# Patient Record
Sex: FEMALE | Race: WHITE | NOT HISPANIC OR LATINO | Employment: FULL TIME | URBAN - METROPOLITAN AREA
[De-identification: names, ages, dates, MRNs, and addresses within clinical notes are randomized per-mention and may not be internally consistent; named-entity substitution may affect disease eponyms.]

---

## 2017-01-05 ENCOUNTER — OFFICE VISIT (OUTPATIENT)
Dept: OBSTETRICS AND GYNECOLOGY | Facility: CLINIC | Age: 27
End: 2017-01-05
Payer: MEDICAID

## 2017-01-05 ENCOUNTER — LAB VISIT (OUTPATIENT)
Dept: LAB | Facility: HOSPITAL | Age: 27
End: 2017-01-05
Attending: NURSE PRACTITIONER
Payer: MEDICAID

## 2017-01-05 VITALS
WEIGHT: 213.38 LBS | BODY MASS INDEX: 39.27 KG/M2 | SYSTOLIC BLOOD PRESSURE: 110 MMHG | HEIGHT: 62 IN | DIASTOLIC BLOOD PRESSURE: 60 MMHG

## 2017-01-05 DIAGNOSIS — F19.10: Primary | ICD-10-CM

## 2017-01-05 DIAGNOSIS — F17.200 CURRENT SMOKER: ICD-10-CM

## 2017-01-05 DIAGNOSIS — F19.10: ICD-10-CM

## 2017-01-05 DIAGNOSIS — O99.321: ICD-10-CM

## 2017-01-05 DIAGNOSIS — O99.321: Primary | ICD-10-CM

## 2017-01-05 LAB
AMPHET+METHAMPHET UR QL: NEGATIVE
BARBITURATES UR QL SCN>200 NG/ML: NEGATIVE
BASOPHILS # BLD AUTO: 0.02 K/UL
BASOPHILS NFR BLD: 0.1 %
BENZODIAZ UR QL SCN>200 NG/ML: NEGATIVE
BILIRUB UR QL STRIP: NEGATIVE
BZE UR QL SCN: NEGATIVE
CANNABINOIDS UR QL SCN: NORMAL
CLARITY UR REFRACT.AUTO: ABNORMAL
COLOR UR AUTO: YELLOW
CREAT UR-MCNC: 186 MG/DL
DIFFERENTIAL METHOD: ABNORMAL
EOSINOPHIL # BLD AUTO: 0.1 K/UL
EOSINOPHIL NFR BLD: 0.6 %
ERYTHROCYTE [DISTWIDTH] IN BLOOD BY AUTOMATED COUNT: 13.3 %
ETHANOL UR-MCNC: <10 MG/DL
GLUCOSE SERPL-MCNC: 106 MG/DL
GLUCOSE UR QL STRIP: NEGATIVE
HCT VFR BLD AUTO: 37.8 %
HGB BLD-MCNC: 12.9 G/DL
HGB UR QL STRIP: NEGATIVE
KETONES UR QL STRIP: NEGATIVE
LEUKOCYTE ESTERASE UR QL STRIP: NEGATIVE
LYMPHOCYTES # BLD AUTO: 3 K/UL
LYMPHOCYTES NFR BLD: 22 %
MCH RBC QN AUTO: 32.2 PG
MCHC RBC AUTO-ENTMCNC: 34.1 %
MCV RBC AUTO: 94 FL
METHADONE UR QL SCN>300 NG/ML: NEGATIVE
MONOCYTES # BLD AUTO: 0.7 K/UL
MONOCYTES NFR BLD: 5.2 %
NEUTROPHILS # BLD AUTO: 9.7 K/UL
NEUTROPHILS NFR BLD: 72 %
NITRITE UR QL STRIP: NEGATIVE
OPIATES UR QL SCN: NORMAL
PCP UR QL SCN>25 NG/ML: NEGATIVE
PH UR STRIP: 6 [PH] (ref 5–8)
PLATELET # BLD AUTO: 249 K/UL
PMV BLD AUTO: 11.3 FL
PROT UR QL STRIP: ABNORMAL
RBC # BLD AUTO: 4.01 M/UL
SP GR UR STRIP: 1.02 (ref 1–1.03)
TOXICOLOGY INFORMATION: NORMAL
URN SPEC COLLECT METH UR: ABNORMAL
UROBILINOGEN UR STRIP-ACNC: NEGATIVE EU/DL
WBC # BLD AUTO: 13.45 K/UL

## 2017-01-05 PROCEDURE — 86850 RBC ANTIBODY SCREEN: CPT

## 2017-01-05 PROCEDURE — 82947 ASSAY GLUCOSE BLOOD QUANT: CPT

## 2017-01-05 PROCEDURE — 86703 HIV-1/HIV-2 1 RESULT ANTBDY: CPT

## 2017-01-05 PROCEDURE — 87340 HEPATITIS B SURFACE AG IA: CPT

## 2017-01-05 PROCEDURE — 86900 BLOOD TYPING SEROLOGIC ABO: CPT

## 2017-01-05 PROCEDURE — 99999 PR PBB SHADOW E&M-EST. PATIENT-LVL III: CPT | Mod: PBBFAC,,, | Performed by: NURSE PRACTITIONER

## 2017-01-05 PROCEDURE — 99203 OFFICE O/P NEW LOW 30 MIN: CPT | Mod: TH,S$PBB,, | Performed by: NURSE PRACTITIONER

## 2017-01-05 PROCEDURE — 86592 SYPHILIS TEST NON-TREP QUAL: CPT

## 2017-01-05 PROCEDURE — 85025 COMPLETE CBC W/AUTO DIFF WBC: CPT

## 2017-01-05 PROCEDURE — 86762 RUBELLA ANTIBODY: CPT

## 2017-01-05 NOTE — MR AVS SNAPSHOT
[facility]  [address] [facility] [address]  Phone: [phone]  Fax: [phone]                  Alize Morrow   2017 2:45 PM   Office Visit    Description:  Female : 1990   Provider:  Sophie Mirza NP   Department:  [facility]           Reason for Visit     Possible Pregnancy           Diagnoses this Visit        Comments    Drug abuse of mother, first trimester    -  Primary     Current smoker                To Do List           Future Appointments        Provider Department Dept Phone    2017 10:00 AM ULTRASOUND, OB-GYN [facility] OB/ -[phone]    2017 10:30 AM Blanca Berger CNM [facility] OB/ -[phone]    2017 11:15 AM ADDITIONS SPECIAL, ONLC [facility] OB/ -[phone]      Goals (5 Years of Data)     None      [facility]     [facility] -  Assistance  Registered nurses in the [facility] provide clinical advisement, health education, appointment booking, and other advisory services.  Call for this free service at [phone].             Medications           Message regarding Medications     Verify the changes and/or additions to your medication regime listed below are the same as discussed with your clinician today.  If any of these changes or additions are incorrect, please notify your healthcare provider.        STOP taking these medications     naproxen sodium (ANAPROX) 550 MG tablet Take 1 tablet (550 mg total) by mouth 2 (two) times daily as needed.           Verify that the below list of medications is an accurate representation of the medications you are currently taking.  If none reported, the list may be blank. If incorrect, please contact your healthcare provider. Carry this list with you in case of emergency.           Current Medications            Clinical Reference Information           Vital Signs - Last Recorded  Most recent update: 2017  2:50 PM  "by Mercy Boss MA    BP Ht Wt LMP BMI    110/60 5' 2" (1.575 m) 96.8 kg (213 lb 6.5 oz) 11/16/2016 39.03 kg/m2      Blood Pressure          Most Recent Value    BP  110/60      Allergies as of 1/5/2017     No Known Allergies      Immunizations Administered on Date of Encounter - 1/5/2017     None      Orders Placed During Today's Visit      Normal Orders This Visit    C. trachomatis/N. gonorrhoeae by AMP DNA Cervicovaginal     POCT urine pregnancy     Toxicology screen, urine     Urinalysis     Urine culture     Future Labs/Procedures Expected by Expires    CBC auto differential  1/5/2017 3/6/2018    Glucose, random  1/5/2017 3/6/2018    Hepatitis B surface antigen  1/5/2017 3/6/2018    HIV-1 and HIV-2 antibodies  1/5/2017 3/6/2018    RPR  1/5/2017 3/6/2018    Rubella antibody, IgG  1/5/2017 3/6/2018    Type & Screen - Ob Profile  1/5/2017 3/6/2018    US OB/GYN Procedure ([facility])  As directed 1/5/2018      [facility] Sign-Up     Activating your [facility] account is as easy as 1-2-3!     1) Visit [email or web address], select Sign Up Now, enter this activation code and your date of birth, then select Next.  [email or web address]  Expires: 2/19/2017  3:28 PM      2) Create a username and password to use when you visit [facility] in the future and select a security question in case you lose your password and select Next.    3) Enter your e-mail address and click Sign Up!    Additional Information  If you have questions, please e-mail [email or web address] or call [phone] to talk to our [facility] staff. Remember, [facility] is NOT to be used for urgent needs. For medical emergencies, dial 911.         Smoking Cessation     If you would like to quit smoking:   You may be eligible for free services if you are a [place] resident and started smoking cigarettes before September 1, 1988.  Call the Smoking Cessation Trust (SCT) toll free at (068) 930-9333 or (603) 384-9922.   Call 1-800-QUIT-NOW if you do not meet the above " criteria.

## 2017-01-05 NOTE — PROGRESS NOTES
"CC: Risk assessment    Alize Morrow is a 26 y.o. female  presents for risk assessment.  LMP: Patient's last menstrual period was 2016. OB h/o one term vaginal delivery. Different FOB. Patient smokes 1-2 PPD. Patient reports " Taking 3-4 Lortab or Percocet everyday for the past 6 years related to back pain ". ELDA 2017. 7 w 1 d. Last pap ( per patient at [facility]). Normal, 2016.    History reviewed. No pertinent past medical history.  Past Surgical History   Procedure Laterality Date    Cervical biopsy  w/ loop electrode excision       Social History     Social History    Marital status: Single     Spouse name: N/A    Number of children: N/A    Years of education: N/A     Occupational History    Not on file.     Social History Main Topics    Smoking status: Current Every Day Smoker     Packs/day: 0.50     Types: Cigarettes    Smokeless tobacco: Never Used    Alcohol use No    Drug use: No    Sexual activity: Yes     Partners: Male     Other Topics Concern    Not on file     Social History Narrative     History reviewed. No pertinent family history.  OB History      Para Term  AB TAB SAB Ectopic Multiple Living    2 1 1       1          Visit Vitals    /60    Ht 5' 2" (1.575 m)    Wt 96.8 kg (213 lb 6.5 oz)    LMP 2016    BMI 39.03 kg/m2         ROS:  GENERAL: Denies weight gain or weight loss. Feeling well overall.   SKIN: Denies rash or lesions.   HEAD: Denies head injury or headache.   NODES: Denies enlarged lymph nodes.   CHEST: Denies chest pain or shortness of breath.   CARDIOVASCULAR: Denies palpitations or left sided chest pain.   ABDOMEN: No abdominal pain, constipation, diarrhea, nausea, vomiting or rectal bleeding.   URINARY: No frequency, dysuria, hematuria, or burning on urination.  REPRODUCTIVE: See HPI.   BREASTS: The patient performs breast self-examination and denies pain, lumps, or nipple discharge.   HEMATOLOGIC: No easy bruisability or " excessive bleeding.   MUSCULOSKELETAL: Denies joint pain or swelling.   NEUROLOGIC: Denies syncope or weakness.   PSYCHIATRIC: Denies depression, anxiety or mood swings.    PHYSICAL EXAM:  APPEARANCE: Obese female, in no acute distress.  AFFECT: WNL, alert and oriented x 3  SKIN: No acne or hirsutism  NECK: Neck symmetric without masses or thyromegaly  NODES: No inguinal, cervical, axillary, or femoral lymph node enlargement  CHEST: Good respiratory effect  ABDOMEN: Soft.  No tenderness or masses.  No hepatosplenomegaly.  No hernias.  BREASTS: Symmetrical, no skin changes or visible lesions.  No palpable masses, nipple discharge bilaterally.  PELVIC: Normal external genitalia without lesions.  Normal hair distribution.  Adequate perineal body, normal urethral meatus.  Vagina moist and well rugated without lesions or discharge.  Cervix pink, without lesions, discharge or tenderness.   Bimanual exam shows uterus to be 7 weeks size, regular, mobile and nontender.  Adnexa without masses or tenderness.    EXTREMITIES: No edema.    1. Drug abuse of mother, first trimester  C. trachomatis/N. gonorrhoeae by AMP DNA Cervicovaginal    CBC auto differential    Hepatitis B surface antigen    HIV-1 and HIV-2 antibodies    POCT urine pregnancy    RPR    Rubella antibody, IgG    Type & Screen - Ob Profile    Urinalysis    Urine culture    US OB/GYN Procedure ([facility])    Toxicology screen, urine    Glucose, random   2. Current smoker  C. trachomatis/N. gonorrhoeae by AMP DNA Cervicovaginal    CBC auto differential    Hepatitis B surface antigen    HIV-1 and HIV-2 antibodies    POCT urine pregnancy    RPR    Rubella antibody, IgG    Type & Screen - Ob Profile    Urinalysis    Urine culture    US OB/GYN Procedure ([facility])    Toxicology screen, urine    Glucose, random    PLAN:  Initial labs; added toxic urine screen  Initial OB ultrasound and visit  Patient was informed that she  Cannot take illegal pain meds and remain in our  practice. Was offered Flexeril, as recommended by Dr. Leah Stone, patient refused.

## 2017-01-06 LAB
ABO + RH BLD: NORMAL
BACTERIA UR CULT: NORMAL
BACTERIA UR CULT: NORMAL
BLD GP AB SCN CELLS X3 SERPL QL: NORMAL
HBV SURFACE AG SERPL QL IA: NEGATIVE
HIV 1+2 AB+HIV1 P24 AG SERPL QL IA: NEGATIVE
RPR SER QL: NORMAL
RUBV IGG SER-ACNC: 8.5 IU/ML
RUBV IGG SER-IMP: ABNORMAL

## 2017-01-06 NOTE — PROGRESS NOTES
Please call patient and inform  Of labs results not immune to Rubella, will need booster after delivery.

## 2017-01-09 LAB
C TRACH DNA SPEC QL NAA+PROBE: NEGATIVE
N GONORRHOEA DNA SPEC QL NAA+PROBE: NEGATIVE

## 2017-01-19 ENCOUNTER — PROCEDURE VISIT (OUTPATIENT)
Dept: OBSTETRICS AND GYNECOLOGY | Facility: CLINIC | Age: 27
End: 2017-01-19
Payer: MEDICAID

## 2017-01-19 ENCOUNTER — INITIAL PRENATAL (OUTPATIENT)
Dept: OBSTETRICS AND GYNECOLOGY | Facility: CLINIC | Age: 27
End: 2017-01-19
Payer: MEDICAID

## 2017-01-19 VITALS
BODY MASS INDEX: 36.69 KG/M2 | SYSTOLIC BLOOD PRESSURE: 112 MMHG | DIASTOLIC BLOOD PRESSURE: 70 MMHG | WEIGHT: 200.63 LBS

## 2017-01-19 DIAGNOSIS — O99.321: ICD-10-CM

## 2017-01-19 DIAGNOSIS — F17.200 CURRENT SMOKER: ICD-10-CM

## 2017-01-19 DIAGNOSIS — O26.841 UTERINE SIZE-DATE DISCREPANCY IN FIRST TRIMESTER: ICD-10-CM

## 2017-01-19 DIAGNOSIS — F19.10: ICD-10-CM

## 2017-01-19 DIAGNOSIS — Z34.91 ENCOUNTER FOR SUPERVISION OF NORMAL PREGNANCY IN FIRST TRIMESTER, UNSPECIFIED GRAVIDITY: Primary | ICD-10-CM

## 2017-01-19 PROCEDURE — 99214 OFFICE O/P EST MOD 30 MIN: CPT | Mod: TH,S$PBB,, | Performed by: ADVANCED PRACTICE MIDWIFE

## 2017-01-19 PROCEDURE — 76801 OB US < 14 WKS SINGLE FETUS: CPT | Mod: 26,S$PBB,, | Performed by: OBSTETRICS & GYNECOLOGY

## 2017-01-19 PROCEDURE — 99999 PR PBB SHADOW E&M-EST. PATIENT-LVL II: CPT | Mod: PBBFAC,,, | Performed by: ADVANCED PRACTICE MIDWIFE

## 2017-01-19 NOTE — MR AVS SNAPSHOT
[facility]  [address] [facility] [facility]  [place] [address]  Phone: [phone]  Fax: [phone]                  Alize Morrow   2017 10:30 AM   Initial Prenatal    Description:  Female : 1990   Provider:  Blanca Berger CNM   Department:  [facility]           Reason for Visit     Initial Prenatal Visit                To Do List           Future Appointments        Provider Department Dept Phone    2017 11:15 AM ADDITIONS SPECIAL, [facility] OB/ -[phone]    2017 3:00 PM [facility] - OB/ -[phone]      Goals (5 Years of Data)     None      [facility]     [facility] -  Assistance  Registered nurses in the [facility] Center provide clinical advisement, health education, appointment booking, and other advisory services.  Call for this free service at [phone].             Medications           Message regarding Medications     Verify the changes and/or additions to your medication regime listed below are the same as discussed with your clinician today.  If any of these changes or additions are incorrect, please notify your healthcare provider.             Verify that the below list of medications is an accurate representation of the medications you are currently taking.  If none reported, the list may be blank. If incorrect, please contact your healthcare provider. Carry this list with you in case of emergency.           Current Medications     PNV14/IRON/FA#1/DHA/KEVIN CA (PNV #14-IRON-FA#1-DHA-DOCUSATE ORAL) Take by mouth.           Clinical Reference Information           Prenatal Vitals     Enc. Date GA Prenatal Vitals Prenatal Pulse Pain Level Urine Albumin/Glucose Edema Presentation Dilation/Effacement/Station    17 8w1d 112/70 / 91 kg (200 lb 9.9 oz)  / us             TW kg (0 lb)   Pregravid weight: 91 kg (200 lb 9.9 oz)       Vital Signs - Last Recorded  Most recent  update: 1/19/2017 10:32 AM by Mary Shirley MA    BP Wt LMP BMI       112/70 91 kg (200 lb 9.9 oz) 11/16/2016 36.69 kg/m2       Allergies as of 1/19/2017     No Known Allergies      Immunizations Administered on Date of Encounter - 1/19/2017     None      [facility] Sign-Up     Activating your [facility] account is as easy as 1-2-3!     1) Visit [email or web address], select Sign Up Now, enter this activation code and your date of birth, then select Next.  [email or web address]  Expires: 2/19/2017  3:28 PM      2) Create a username and password to use when you visit [facility] in the future and select a security question in case you lose your password and select Next.    3) Enter your e-mail address and click Sign Up!    Additional Information  If you have questions, please e-mail [email or web address] or call [phone] to talk to our [facility] staff. Remember, [facility] is NOT to be used for urgent needs. For medical emergencies, dial 911.         Smoking Cessation     If you would like to quit smoking:   You may be eligible for free services if you are a [place] resident and started smoking cigarettes before September 1, 1988.  Call the Smoking Cessation Trust (SCT) toll free at (282) 886-2970 or (567) 775-3116.   Call -800-QUIT-NOW if you do not meet the above criteria.

## 2017-01-19 NOTE — PROGRESS NOTES
Pt here for new ob visit. Reoriented to the practice including CNM/MD collaboration  Blue bag items reviewed and discussed  Encouraged to quit smoking. States no longer taking narcotics for back pain  Unsure about paternity   Reviewed labs, rubella non immune

## 2017-02-23 ENCOUNTER — TELEPHONE (OUTPATIENT)
Dept: OBSTETRICS AND GYNECOLOGY | Facility: CLINIC | Age: 27
End: 2017-02-23

## 2017-02-23 NOTE — TELEPHONE ENCOUNTER
----- Message from Ricky Lord sent at 2/23/2017  1:18 PM CST -----  Pt is requesting a call from nurse in regards to r/s her appt.            Please call pt back at [phone]

## 2017-03-08 ENCOUNTER — LAB VISIT (OUTPATIENT)
Dept: LAB | Facility: HOSPITAL | Age: 27
End: 2017-03-08
Attending: ADVANCED PRACTICE MIDWIFE
Payer: MEDICAID

## 2017-03-08 ENCOUNTER — ROUTINE PRENATAL (OUTPATIENT)
Dept: OBSTETRICS AND GYNECOLOGY | Facility: CLINIC | Age: 27
End: 2017-03-08
Payer: MEDICAID

## 2017-03-08 VITALS
BODY MASS INDEX: 36.53 KG/M2 | WEIGHT: 199.75 LBS | DIASTOLIC BLOOD PRESSURE: 66 MMHG | SYSTOLIC BLOOD PRESSURE: 128 MMHG

## 2017-03-08 DIAGNOSIS — Z34.92 ENCOUNTER FOR SUPERVISION OF NORMAL PREGNANCY IN SECOND TRIMESTER, UNSPECIFIED GRAVIDITY: Primary | ICD-10-CM

## 2017-03-08 DIAGNOSIS — Z36.3 ENCOUNTER FOR ROUTINE SCREENING FOR MALFORMATION USING ULTRASONICS: ICD-10-CM

## 2017-03-08 DIAGNOSIS — Z34.92 ENCOUNTER FOR SUPERVISION OF NORMAL PREGNANCY IN SECOND TRIMESTER, UNSPECIFIED GRAVIDITY: ICD-10-CM

## 2017-03-08 PROCEDURE — 36415 COLL VENOUS BLD VENIPUNCTURE: CPT

## 2017-03-08 PROCEDURE — 81511 FTL CGEN ABNOR FOUR ANAL: CPT

## 2017-03-08 PROCEDURE — 99213 OFFICE O/P EST LOW 20 MIN: CPT | Mod: TH,S$PBB,, | Performed by: ADVANCED PRACTICE MIDWIFE

## 2017-03-08 PROCEDURE — 99999 PR PBB SHADOW E&M-EST. PATIENT-LVL II: CPT | Mod: PBBFAC,,, | Performed by: ADVANCED PRACTICE MIDWIFE

## 2017-03-08 NOTE — PROGRESS NOTES
Doing well. Denies problems. + UDS first visit, pt states no longer taking. Explained random UDS with pregnancy and in labor. Pt verbalizes understanding. Discussed quad screen, pt desires. Anatomy u/s next visit. bg

## 2017-03-10 LAB
ALPHA FETOPROTEIN MATERNAL: 31.3 NG/ML
DOWN RISK (<1:270): NORMAL
ETHNIC ORIGIN: NORMAL
GA METHOD: NORMAL
GESTATIONAL AGE (DAYS): 0
GESTATIONAL AGE (WEEKS): 15
HUMAN CHORIONIC GONADOTROPIN: 18.8 IU/ML
INHIBIN A: 218.1 PG/ML
INSULIN DEPEND. DIABETES: NORMAL
M.O.M. ALPHA FETOPROTEIN: 1.31
M.O.M. HCG: 0.44
M.O.M. INHIBIN A: 1.37
M.O.M. UNCONJ. ESTRIOL: 0.67
MATERNAL AGE AT EDD (YRS): 26
MATERNAL AGE FOR DOWN: NORMAL
MATERNAL WEIGHT (LBS): 200
MULTIPLE GESTATIONS: NORMAL
QUAD SCREEN INTERPRETATION: NORMAL
QUAD SCREEN: NEGATIVE
TRISOMY 18 (<1:100): NORMAL
UNCONJUGATED ESTRIOL: 0.43 NG/ML

## 2017-03-13 ENCOUNTER — TELEPHONE (OUTPATIENT)
Dept: OBSTETRICS AND GYNECOLOGY | Facility: CLINIC | Age: 27
End: 2017-03-13

## 2017-03-13 RX ORDER — BUTALBITAL, ACETAMINOPHEN AND CAFFEINE 50; 325; 40 MG/1; MG/1; MG/1
1 TABLET ORAL EVERY 4 HOURS PRN
Qty: 20 TABLET | Refills: 2 | Status: SHIPPED | OUTPATIENT
Start: 2017-03-13 | End: 2017-04-09

## 2017-03-13 NOTE — TELEPHONE ENCOUNTER
----- Message from Yojana Humphrey sent at 3/13/2017  3:20 PM CDT -----  Call pt at [phone]/////pt say she has call 3 x and need to speak with someone pt say this is urgent regarding something for sung//cricket ht

## 2017-04-09 ENCOUNTER — HOSPITAL ENCOUNTER (EMERGENCY)
Facility: HOSPITAL | Age: 27
Discharge: HOME | End: 2017-04-09
Payer: MEDICAID

## 2017-04-09 VITALS
HEART RATE: 98 BPM | BODY MASS INDEX: 33.13 KG/M2 | OXYGEN SATURATION: 98 % | TEMPERATURE: 98 F | SYSTOLIC BLOOD PRESSURE: 142 MMHG | WEIGHT: 180 LBS | HEIGHT: 62 IN | RESPIRATION RATE: 18 BRPM | DIASTOLIC BLOOD PRESSURE: 72 MMHG

## 2017-04-09 DIAGNOSIS — G43.109 MIGRAINE WITH AURA AND WITHOUT STATUS MIGRAINOSUS, NOT INTRACTABLE: Primary | ICD-10-CM

## 2017-04-09 PROCEDURE — 99283 EMERGENCY DEPT VISIT LOW MDM: CPT

## 2017-04-09 PROCEDURE — 25000003 PHARM REV CODE 250: Performed by: NURSE PRACTITIONER

## 2017-04-09 RX ORDER — HYDROCODONE BITARTRATE AND ACETAMINOPHEN 5; 325 MG/1; MG/1
1 TABLET ORAL EVERY 4 HOURS PRN
Qty: 18 TABLET | Refills: 0 | Status: SHIPPED | OUTPATIENT
Start: 2017-04-09 | End: 2017-05-12

## 2017-04-09 RX ORDER — BUTALBITAL, ASPIRIN, AND CAFFEINE 325; 50; 40 MG/1; MG/1; MG/1
1 CAPSULE ORAL EVERY 4 HOURS PRN
COMMUNITY
End: 2017-06-23

## 2017-04-09 RX ORDER — HYDROCODONE BITARTRATE AND ACETAMINOPHEN 5; 325 MG/1; MG/1
1 TABLET ORAL
Status: COMPLETED | OUTPATIENT
Start: 2017-04-09 | End: 2017-04-09

## 2017-04-09 RX ADMIN — HYDROCODONE BITARTRATE AND ACETAMINOPHEN 1 TABLET: 5; 325 TABLET ORAL at 03:04

## 2017-04-09 NOTE — ED NOTES
Patient identifiers verified and correct for Alize Morrow.    Patient c/o headache and nausea for the last 4 days    LOC: The patient is awake, alert and aware of environment with an appropriate affect, the patient is oriented x 3 and speaking appropriately, headache  APPEARANCE: Patient resting comfortably and in no acute distress, patient is clean and well groomed, patient's clothing is properly fastened.  SKIN: The skin is warm and dry, color consistent with ethnicity, patient has normal skin turgor and moist mucus membranes, skin intact, no breakdown or bruising noted.  MUSCULOSKELETAL: Patient moving all extremities spontaneously.  RESPIRATORY: Airway is open and patent, respirations are spontaneous.  CARDIAC: Patient has a normal rate, no periphreal edema noted, capillary refill < 3 seconds, elevated BP  ABDOMEN: Soft and non tender to palpation, nausea

## 2017-04-09 NOTE — DISCHARGE INSTRUCTIONS

## 2017-04-09 NOTE — ED AVS SNAPSHOT
[facility]  [address] [facility] [address]               Alize Morrow   2017  2:49 PM   ED    Description:  Female : 1990   [facility]            Your Care was Coordinated By:     Provider Role From To    Enrike Chin NP Nurse Practitioner 17 1448 --      Reason for Visit     Migraine           Diagnoses this Visit        Comments    Migraine with aura and without status migrainosus, not intractable    -  Primary       ED Disposition     None           To Do List           Follow-up Information     Follow up with [facility]. Schedule an appointment as soon as possible for a visit in 1 day.    Specialty:  Obstetrics and Gynecology    Why:  follow up    Contact information:    [address] [facility] [address] [phone]    Additional information:    (off [facility]) 3rd floor       These Medications        Disp Refills Start End    hydrocodone-acetaminophen 5-325mg (NORCO) 5-325 mg per tablet 18 tablet 0 2017     Take 1 tablet by mouth every 4 (four) hours as needed for Pain. - Oral    Pharmacy: [facility] [address] [place] [address] #: [phone]         [facility]     [facility] Care [facility] -  Assistance  Unless otherwise directed by your provider, please contact [facility], our nurse care line that is available for  assistance.     Registered nurses in the [facility] provide: appointment scheduling, clinical advisement, health education, and other advisory services.  Call: [phone] (toll free)               Medications           Message regarding Medications     Verify the changes and/or additions to your medication regime listed below are the same as discussed with your clinician today.  If any of these changes or additions are incorrect, please notify your healthcare provider.        START  "taking these NEW medications        Refills    hydrocodone-acetaminophen 5-325mg (NORCO) 5-325 mg per tablet 0    Sig: Take 1 tablet by mouth every 4 (four) hours as needed for Pain.    Class: Print    Route: Oral      These medications were administered today        Dose Freq    hydrocodone-acetaminophen 5-325mg per tablet 1 tablet 1 tablet ED 1 Time    Sig: Take 1 tablet by mouth ED 1 Time.    Class: Normal    Route: Oral    Cosign for Ordering: Required by Sophie Shin, DO      STOP taking these medications     butalbital-acetaminophen-caffeine -40 mg (FIORICET, ESGIC) -40 mg per tablet Take 1 tablet by mouth every 4 (four) hours as needed for Pain.           Verify that the below list of medications is an accurate representation of the medications you are currently taking.  If none reported, the list may be blank. If incorrect, please contact your healthcare provider. Carry this list with you in case of emergency.           Current Medications     hydrocodone-acetaminophen 5-325mg (NORCO) 5-325 mg per tablet Take 1 tablet by mouth every 4 (four) hours as needed for Pain.    hydrocodone-acetaminophen 5-325mg per tablet 1 tablet Take 1 tablet by mouth ED 1 Time.    PNV14/IRON/FA#1/DHA/KEVIN CA (PNV #14-IRON-FA#1-DHA-DOCUSATE ORAL) Take by mouth.           Clinical Reference Information           Your Vitals Were     BP Pulse Temp Resp Height Weight    158/74 105 97.8 °F (36.6 °C) 20 5' 2" (1.575 m) 81.6 kg (180 lb)    Last Period SpO2 BMI          11/16/2016 99% 32.92 kg/m2        Allergies as of 4/9/2017     No Known Allergies      Immunizations Administered on Date of Encounter - 4/9/2017     None      ED Micro, Lab, POCT     None      ED Imaging Orders     None        Discharge Instructions             Migraine Headache  This often severe type of headache is different from other types of headaches in that symptoms other than pain occur with the headache. Nausea and vomiting, lightheadedness, " sensitivity to light (photophobia), and other visual disturbances are common migraine symptoms. The pain may last from a few hours to several days. It is not clear why migraines occur but certain factors called triggers can raise the risk of having a migraine attack. A migraine may be triggered by emotional stress or depression, or by hormone changes during the menstrual cycle. Other triggers include birth control pills, overuse of migraine medicines, alcohol or caffeine, foods with tyramine (such as aged cheese and wine), eyestrain, weather changes, missed meals, or too little or too much sleep.  Home care  Follow these tips when taking care of yourself at home:  · Dont drive yourself home if you were given pain medicine for your headache or are having visual symptoms. Instead, have someone else drive you home. Try to sleep when you get home. You should feel much better when you wake up.  · Cold can help ease migraine symptoms. Put an ice pack on your forehead or at the base of your skull. Put heat on the back of your neck to help ease any neck spasm.  · Drink only clear liquids or eat a light diet until your symptoms get better. This will help you avoid nausea and vomiting.  How to prevent migraines  Pay attention to what seems to trigger your headache. Try to avoid the triggers when you can. If you have frequent headaches, consider keeping a headache diary. In it, write down what you were doing, feeling, or eating in the hours before each headache. Show this to your healthcare provider to help find the cause of your headaches.  If stress seems to be a trigger for your headaches, figure out what is causing stress in your life. Learn new ways to handle your stress. Ideas include regular exercise, biofeedback, self-hypnosis, yoga, and meditation. Talk with your healthcare provider to find out more information about managing stress. Many books and digital media are also available on this subject.  Tyramine is a  substance found in many foods. It can trigger a migraine in some people. These foods contain tyramine:  · Chocolate  · Yogurt  · All cheeses, but especially aged cheeses  · Smoked or pickled fish and meat, including herring, caviar, bologna, pepperoni, and salami  · Liver  · Avocados  · Bananas  · Figs  · Raisins  · Red wine  Try staying away from these foods for 1 to 2 months to see if you have fewer headaches.  How to treat future headaches  · Take time out at the first sign of a headache, if possible. Find a quiet, dark, comfortable place to sit or lie down. Let yourself relax or sleep.  · Put an ice pack on your forehead or on the area of greatest pain. A heating pad and massage may help if you are having a muscle spasm and tightness in your neck.  · If you have been prescribed a medicine to stop a migraine headache, use this at the first warning sign of the headache for best results. First signs may be an aura or pain.  · If you need to take medicine often for your migraine, talk with your healthcare provider about other ways to prevent your headaches.  Follow-up care  Follow up with your healthcare provider, or as advised. Talk with your provider if you have frequent headaches. He or she can figure out a treatment plan. Ask if you can have medicine to take at home the next time you get a bad headache. This may keep you from having to visit the emergency department in the future. You may need to see a headache specialist (neurologist) if you continue to have headaches.  When to seek medical advice  Call your healthcare provider right away if any of these occur:  · Your head pain gets worse, or doesnt get better within 24 hours  · You cant keep liquids down (repeated vomiting)  · Pain in your sinuses, ears, or throat  · Fever of 100.4º F (38º C) or higher, or as directed by your healthcare provider  · Stiff neck  · Extreme drowsiness, confusion, or fainting  · Dizziness, or dizziness with spinning sensation  (vertigo)  · Weakness in an arm or leg, or on one side of your face  · Difficulty talking or seeing  Date Last Reviewed: 8/1/2016 © 2000-2016 [employer]. [address] [place] [address], [place] [address]. All rights reserved. This information is not intended as a substitute for professional medical care. Always follow your healthcare professional's instructions.          Your Scheduled Appointments     Apr 21, 2017 10:00 AM CDT   Ultrasound with ULTRASOUND, OB-GYN [facility]   [facility] ([facility])    [address] [place] [address]   [phone]            Apr 21, 2017 11:00 AM CDT   Routine Prenatal Visit with Blanca Berger CNM   [facility] ([facility])    [address] [place] [address]   [phone]              [facility] Sign-Up     Activating your [facility] account is as easy as 1-2-3!     1) Visit [email or web address], select Sign Up Now, enter this activation code and your date of birth, then select Next.  [email or web address]  Expires: 5/24/2017  3:30 PM      2) Create a username and password to use when you visit [facility] in the future and select a security question in case you lose your password and select Next.    3) Enter your e-mail address and click Sign Up!    Additional Information  If you have questions, please e-mail [email or web address] or call [phone] to talk to our [facility] staff. Remember, [facility] is NOT to be used for urgent needs. For medical emergencies, dial 911.          [facility] complies with applicable Federal civil rights laws and does not discriminate on the basis of race, color, national origin, age, disability, or sex.        Language Assistance Services     ATTENTION: Language assistance services are available, free of charge. Please call [phone].      ATENCIÓN: Si habla español, tiene a colvin disposición servicios gratuitos de asistencia lingüística. Llame al  [phone].     ROSE Ý: N?u b?n nói Ti?ng Vi?t, có các d?ch v? h? tr? ngôn ng? mi?n phí dành cho b?n. G?i s? [phone].

## 2017-04-10 NOTE — ED PROVIDER NOTES
Encounter Date: 4/9/2017       History     Chief Complaint   Patient presents with    Migraine     4 days, 19 weeks pregnant     Review of patient's allergies indicates:  No Known Allergies  Patient is a 27 y.o. female presenting with the following complaint: headaches. The history is provided by the patient.   Headache    This is a recurrent problem. The current episode started in the past 7 days. The problem occurs constantly. The pain is located in the bilateral region. The pain does not radiate. The pain is at a severity of 9/10. Associated symptoms include sinus pressure. Pertinent negatives include no abdominal pain, coughing, hearing loss, nausea, sore throat, tinnitus or vomiting. She has tried acetaminophen (Fioricet) for the symptoms. Her past medical history is significant for migraine headaches. There is no history of cancer, cluster headaches, hypertension, immunosuppression, migraines in the family, obesity, pseudotumor cerebri, recent head traumas, sinus disease or TMJ.   Gradual onset of headache over the pass 2 months and continues to worsen. Fioricet no working.       Past Medical History:   Diagnosis Date    Abnormal Pap smear of cervix     Migraine headache      Past Surgical History:   Procedure Laterality Date    CERVICAL BIOPSY  W/ LOOP ELECTRODE EXCISION       Family History   Problem Relation Age of Onset    Cancer Mother      colon     Diabetes Mother      Social History   Substance Use Topics    Smoking status: Current Every Day Smoker     Packs/day: 0.50     Types: Cigarettes    Smokeless tobacco: Never Used    Alcohol use No     Review of Systems   Constitutional: Negative for activity change and appetite change.   HENT: Positive for sinus pressure. Negative for congestion, dental problem, facial swelling, hearing loss, sneezing, sore throat, tinnitus, trouble swallowing and voice change.    Respiratory: Negative for apnea, cough, choking, chest tightness, shortness of breath,  wheezing and stridor.    Cardiovascular: Negative for chest pain, palpitations and leg swelling.   Gastrointestinal: Negative for abdominal distention, abdominal pain, constipation, nausea and vomiting.   Genitourinary: Negative.  Negative for vaginal bleeding and vaginal discharge.   Musculoskeletal: Negative.    Skin: Negative.  Negative for color change, pallor, rash and wound.   Neurological: Positive for headaches.   Hematological: Negative.    Psychiatric/Behavioral: Negative.        Physical Exam   Initial Vitals   BP Pulse Resp Temp SpO2   04/09/17 1421 04/09/17 1421 04/09/17 1421 04/09/17 1421 04/09/17 1421   158/74 105 20 97.8 °F (36.6 °C) 99 %     Physical Exam    Nursing note and vitals reviewed.  Constitutional: She appears well-developed and well-nourished.   HENT:   Head: Normocephalic and atraumatic.   Eyes: Right eye exhibits no discharge. Left eye exhibits no discharge.   Neck: Normal range of motion. Neck supple.   Cardiovascular: Exam reveals no gallop and no friction rub.    No murmur heard.  Pulmonary/Chest: No respiratory distress. She has no wheezes. She has no rhonchi. She has no rales. She exhibits no tenderness.   Abdominal: She exhibits no distension. There is no tenderness. There is no rebound and no guarding.   Musculoskeletal: Normal range of motion. She exhibits no tenderness.   Neurological: She is alert and oriented to person, place, and time. She displays normal reflexes. No cranial nerve deficit or sensory deficit.   Skin: Skin is warm.   Psychiatric: She has a normal mood and affect. Her behavior is normal. Judgment and thought content normal.         ED Course   Procedures  Labs Reviewed - No data to display                            ED Course     Clinical Impression:   The encounter diagnosis was Migraine with aura and without status migrainosus, not intractable.    Disposition:   Disposition: Discharged  Condition: Stable       Enrike Chin NP  04/09/17 1953

## 2017-04-21 ENCOUNTER — ROUTINE PRENATAL (OUTPATIENT)
Dept: OBSTETRICS AND GYNECOLOGY | Facility: CLINIC | Age: 27
End: 2017-04-21
Payer: MEDICAID

## 2017-04-21 ENCOUNTER — PROCEDURE VISIT (OUTPATIENT)
Dept: OBSTETRICS AND GYNECOLOGY | Facility: CLINIC | Age: 27
End: 2017-04-21
Payer: MEDICAID

## 2017-04-21 VITALS — WEIGHT: 194 LBS | SYSTOLIC BLOOD PRESSURE: 126 MMHG | DIASTOLIC BLOOD PRESSURE: 70 MMHG | BODY MASS INDEX: 35.48 KG/M2

## 2017-04-21 DIAGNOSIS — Z36.3 ENCOUNTER FOR ROUTINE SCREENING FOR MALFORMATION USING ULTRASONICS: ICD-10-CM

## 2017-04-21 DIAGNOSIS — Z3A.21 21 WEEKS GESTATION OF PREGNANCY: ICD-10-CM

## 2017-04-21 DIAGNOSIS — F11.91 HISTORY OF NARCOTIC USE: Primary | ICD-10-CM

## 2017-04-21 DIAGNOSIS — Z28.39 RUBELLA NON-IMMUNE STATUS, ANTEPARTUM: ICD-10-CM

## 2017-04-21 DIAGNOSIS — O09.899 RUBELLA NON-IMMUNE STATUS, ANTEPARTUM: ICD-10-CM

## 2017-04-21 DIAGNOSIS — F17.200 SMOKER: ICD-10-CM

## 2017-04-21 PROCEDURE — 99999 PR PBB SHADOW E&M-EST. PATIENT-LVL II: CPT | Mod: PBBFAC,,, | Performed by: ADVANCED PRACTICE MIDWIFE

## 2017-04-21 PROCEDURE — 76805 OB US >/= 14 WKS SNGL FETUS: CPT | Mod: 26,S$PBB,, | Performed by: OBSTETRICS & GYNECOLOGY

## 2017-04-21 PROCEDURE — 76805 OB US >/= 14 WKS SNGL FETUS: CPT | Mod: PBBFAC | Performed by: OBSTETRICS & GYNECOLOGY

## 2017-04-21 PROCEDURE — 99212 OFFICE O/P EST SF 10 MIN: CPT | Mod: PBBFAC | Performed by: ADVANCED PRACTICE MIDWIFE

## 2017-04-21 PROCEDURE — 99212 OFFICE O/P EST SF 10 MIN: CPT | Mod: TH,S$PBB,, | Performed by: ADVANCED PRACTICE MIDWIFE

## 2017-04-21 RX ORDER — SUMATRIPTAN 50 MG/1
50 TABLET, FILM COATED ORAL ONCE AS NEEDED
Qty: 15 TABLET | Refills: 0 | Status: SHIPPED | OUTPATIENT
Start: 2017-04-21 | End: 2017-04-21

## 2017-04-21 NOTE — PROGRESS NOTES
"Patient c/o daily headaches. States she has been to the ER 3x in the past 3 weeks. Has tried Tylenol and Fioricet, drinks "plenty of water," and rests when she gets the headaches. 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 Discussed taking breaks from computer screen. States ER gave her hydrocodone and this is the "only thing that helps." Explained we will NOT prescribe narcotics due to risk in pregnancy as well as hx of narcotic abuse. Rx sent to sumatriptan. Explained use, r/b/a, cat C. Patient v/u.   Supbopt heart. Visualized anatomy WNL.   L&D precautions reviewed.  "

## 2017-04-21 NOTE — MR AVS SNAPSHOT
[facility]  [address] [facility] [address]  Phone: [phone]  Fax: [phone]                  Alize Morrow   2017 11:30 AM   Routine Prenatal    Description:  Female : 1990   Provider:  Dorota Snyder CNM   Department:  [facility]           Reason for Visit     Routine Prenatal Visit           Diagnoses this Visit        Comments    Evaluate anatomy not seen on prior sonogram    -  Primary     History of narcotic use         Smoker         Rubella non-immune status, antepartum                To Do List           Future Appointments        Provider Department Dept Phone    2017 11:30 AM PATRICK Jimenezal - OB/ -[phone]    2017 10:30 AM ULTRASOUND, OB-GYN CHRISTINA [facility]ilesal - OB/ -[phone]    2017 11:15 AM PATRICK [facility]enezArnel - OB/ -[phone]      Goals (5 Years of Data)     None       These Medications        Disp Refills Start End    sumatriptan (IMITREX) 50 MG tablet 15 tablet 0 2017    Take 1 tablet (50 mg total) by mouth once as needed for Migraine. May repeat dose x1 after 2 hour - Oral    Pharmacy: [facility] [address] [place] [address] #: [phone]         [facility]     [facility] Nurse Care Line -  Assistance  Unless otherwise directed by your provider, please contact [facility], our nurse care line that is available for  assistance.     Registered nurses in the [facility] provide: appointment scheduling, clinical advisement, health education, and other advisory services.  Call: [phone] (toll free)               Medications           Message regarding Medications     Verify the changes and/or additions to your medication regime listed below are the same as discussed with your clinician today.  If any of these changes or additions are incorrect, please notify your healthcare provider.        START  taking these NEW medications        Refills    sumatriptan (IMITREX) 50 MG tablet 0    Sig: Take 1 tablet (50 mg total) by mouth once as needed for Migraine. May repeat dose x1 after 2 hour    Class: Normal    Route: Oral           Verify that the below list of medications is an accurate representation of the medications you are currently taking.  If none reported, the list may be blank. If incorrect, please contact your healthcare provider. Carry this list with you in case of emergency.           Current Medications     butalbital-aspirin-caffeine -40 mg (FIORINAL) -40 mg Cap Take 1 capsule by mouth every 4 (four) hours as needed.    hydrocodone-acetaminophen 5-325mg (NORCO) 5-325 mg per tablet Take 1 tablet by mouth every 4 (four) hours as needed for Pain.    PNV14/IRON/FA#1/DHA/KEVIN CA (PNV #14-IRON-FA#1-DHA-DOCUSATE ORAL) Take by mouth.    sumatriptan (IMITREX) 50 MG tablet Take 1 tablet (50 mg total) by mouth once as needed for Migraine. May repeat dose x1 after 2 hour           Clinical Reference Information           Prenatal Vitals     Enc. Date GA Prenatal Vitals Prenatal Pulse Pain Level Urine Albumin/Glucose Edema Presentation Dilation/Effacement/Station    4/21/17 21w2d 126/70 / 88 kg (194 lb 0.1 oz)  / us 141 / Present  0  /  None / None / None / No      3/8/17 15w0d 128/66 / 90.6 kg (199 lb 11.8 oz)  / 153  0 Negative / Negative None / None      1/19/17 8w1d 112/70 / 91 kg (200 lb 9.9 oz)  / us   Negative / Negative None / None         TWG: -3 kg (-6 lb 9.8 oz)   Pregravid weight: 91 kg (200 lb 9.9 oz)       Your Vitals Were     BP Weight Last Period BMI       126/70 88 kg (194 lb 0.1 oz) 11/16/2016 35.48 kg/m2       Allergies as of 4/21/2017     No Known Allergies      Immunizations Administered on Date of Encounter - 4/21/2017     None      Orders Placed During Today's Visit     Future Labs/Procedures Expected by Expires    US OB/GYN Procedure ([facility])-Future  As directed 4/21/2018       [facility] Sign-Up     Activating your [facility] account is as easy as 1-2-3!     1) Visit [email or web address], select Sign Up Now, enter this activation code and your date of birth, then select Next.  [email or web address]  Expires: 5/24/2017  3:30 PM      2) Create a username and password to use when you visit [facility] in the future and select a security question in case you lose your password and select Next.    3) Enter your e-mail address and click Sign Up!    Additional Information  If you have questions, please e-mail [email or web address] or call [phone] to talk to our [facility] staff. Remember, [facility] is NOT to be used for urgent needs. For medical emergencies, dial 911.         Smoking Cessation     If you would like to quit smoking:   You may be eligible for free services if you are a [place] resident and started smoking cigarettes before September 1, 1988.  Call the Smoking Cessation Trust ([facility]) toll free at (715) 424-7220 or (983) 345-7379.   Call 8-815-QUIT-NOW if you do not meet the above criteria.   Contact us via email: [email or web address]   View our website for more information: [email or web address]        Language Assistance Services     ATTENTION: Language assistance services are available, free of charge. Please call [phone].      ATENCIÓN: Si habla español, tiene a colvin disposición servicios gratuitos de asistencia lingüística. Llame al [phone].     CHÚ Ý: N?u b?n nói Ti?ng Vi?t, có các d?ch v? h? tr? ngôn ng? mi?n phí dành cho b?n. G?i s? [phone].         [facility] complies with applicable Federal civil rights laws and does not discriminate on the basis of race, color, national origin, age, disability, or sex.

## 2017-04-26 VITALS
WEIGHT: 190 LBS | HEIGHT: 62 IN | TEMPERATURE: 98 F | BODY MASS INDEX: 34.96 KG/M2 | DIASTOLIC BLOOD PRESSURE: 61 MMHG | HEART RATE: 83 BPM | OXYGEN SATURATION: 96 % | RESPIRATION RATE: 18 BRPM | SYSTOLIC BLOOD PRESSURE: 118 MMHG

## 2017-04-26 PROCEDURE — 10060 I&D ABSCESS SIMPLE/SINGLE: CPT

## 2017-04-26 PROCEDURE — 99283 EMERGENCY DEPT VISIT LOW MDM: CPT | Mod: 25

## 2017-04-27 ENCOUNTER — HOSPITAL ENCOUNTER (EMERGENCY)
Facility: HOSPITAL | Age: 27
Discharge: HOME | End: 2017-04-27
Payer: MEDICAID

## 2017-04-27 DIAGNOSIS — L02.415 ABSCESS OF RIGHT THIGH: Primary | ICD-10-CM

## 2017-04-27 DIAGNOSIS — M79.651 ACUTE THIGH PAIN, RIGHT: ICD-10-CM

## 2017-04-27 PROCEDURE — 10060 I&D ABSCESS SIMPLE/SINGLE: CPT

## 2017-04-27 RX ORDER — CLINDAMYCIN HYDROCHLORIDE 300 MG/1
300 CAPSULE ORAL EVERY 6 HOURS
COMMUNITY
End: 2017-05-12

## 2017-04-27 NOTE — ED NOTES
Bed: 22  Expected date:   Expected time:   Means of arrival:   Comments:  Closed     Izzy Arechiga PA-C  04/27/17 0014

## 2017-04-27 NOTE — ED AVS SNAPSHOT
[facility]  [address] [facility] [address]               Alize Morrow   2017 12:08 AM   ED    Description:  Female : 1990   Department:  [facility]            Your Care was Coordinated By:     Provider Role From To    Izzy Arechiga PA-C Physician Assistant 17 [phone] --      Reason for Visit     Abscess           Diagnoses this Visit        Comments    Abscess of right thigh    -  Primary     Acute thigh pain, right           ED Disposition     None           To Do List           Follow-up Information     Follow up with [facility] In 3 days.    Specialty:  Internal Medicine    Contact information:    [address] [facility] [address]  [facility] [address] [phone]    Additional information:    [facility] 1st [facility]      [facility]     [facility] Care [facility] -  Assistance  Unless otherwise directed by your provider, please contact [facility], our nurse care line that is available for  assistance.     Registered nurses in the [facility] provide: appointment scheduling, clinical advisement, health education, and other advisory services.  Call: [phone] (toll free)               Medications           Message regarding Medications     Verify the changes and/or additions to your medication regime listed below are the same as discussed with your clinician today.  If any of these changes or additions are incorrect, please notify your healthcare provider.             Verify that the below list of medications is an accurate representation of the medications you are currently taking.  If none reported, the list may be blank. If incorrect, please contact your healthcare provider. Carry this list with you in case of emergency.           Current Medications     butalbital-aspirin-caffeine -40 mg (FIORINAL) -40 mg Cap Take 1 capsule by mouth every 4 (four) hours  "as needed.    clindamycin (CLEOCIN) 300 MG capsule Take 300 mg by mouth every 6 (six) hours.    hydrocodone-acetaminophen 5-325mg (NORCO) 5-325 mg per tablet Take 1 tablet by mouth every 4 (four) hours as needed for Pain.    PNV14/IRON/FA#1/DHA/KEVIN CA (PNV #14-IRON-FA#1-DHA-DOCUSATE ORAL) Take by mouth.    sumatriptan (IMITREX) 50 MG tablet Take 1 tablet (50 mg total) by mouth once as needed for Migraine. May repeat dose x1 after 2 hour           Clinical Reference Information           Your Vitals Were     BP Pulse Temp Resp Height Weight    118/61 (BP Location: Right arm, Patient Position: Sitting) 83 97.8 °F (36.6 °C) (Oral) 18 5' 2" (1.575 m) 86.2 kg (190 lb)    Last Period SpO2 BMI          11/16/2016 96% 34.75 kg/m2        Allergies as of 4/27/2017     No Known Allergies      Immunizations Administered on Date of Encounter - 4/27/2017     None      ED Micro, Lab, POCT     None      ED Imaging Orders     None        Discharge Instructions         Abscess (Incision & Drainage)  An abscess (sometimes called a boil) occurs when bacteria get trapped under the skin and start to grow. Pus forms inside the abscess as the body responds to the bacteria. An abscess can happen with an insect bite, ingrown hair, blocked oil gland, pimple, cyst, or puncture wound.  Your healthcare provider has drained the pus from your abscess. If the abscess pocket was large, your healthcare provider may have inserted gauze packing. Your provider will need to remove and possibly replace it on your next visit. You may not need antibiotics to treat a simple abscess, unless the infection is spreading into the skin around the wound (cellulitis).  Healing of the wound will take about 1 to 2 weeks, depending on the size of the abscess. Healthy tissue will grow from the bottom and sides of the opening until it seals over.  Home care  These tips can help your wound heal:  · The wound may drain for the first 2 days. Cover the wound with a clean " dry dressing. If the dressing becomes soaked with blood or pus, change it.  · If a gauze packing was placed inside the abscess cavity, you may be told to remove it yourself. You may do this in the shower. Once the packing is removed, you should wash the area in the shower or bath 3 to 4 times a day, until the skin opening has closed. Make sure you wash your hands after changing the packing or cleaning the wound.  · If you were prescribed antibiotics, take them as directed until they are all gone.  · You may use acetaminophen or ibuprofen to control pain, unless another pain medicine was prescribed. If you have liver disease or ever had a stomach ulcer, talk with your doctor before using these medicines.  Follow-up care  Follow up with your healthcare provider, or as advised. If a gauze packing was inserted in your wound, it should be removed in 1 to 2 days. Check your wound every day for the signs of worsening infection listed below.  When to seek medical advice  Call your healthcare provider right away if any of these occur:  · Increasing redness or swelling  · Red streaks in the skin leading away from the wound  · Increasing local pain or swelling  · Continued pus draining from the wound 2 days after treatment  · Fever of 100.4ºF (38ºC) or higher, or as directed by your healthcare provider  · Boil returns when you are at home  Date Last Reviewed: 9/1/2016 © 2000-2016 [employer]. [address] [place] [address]. All rights reserved. This information is not intended as a substitute for professional medical care. Always follow your healthcare professional's instructions.          Your Scheduled Appointments     May 12, 2017 10:30 AM CDT   Ultrasound with ULTRASOUND, OB-GYN CHRISTINA [facility] - OB/ GYN ([facility])    [address] [facility] [address] [phone]            May 12, 2017 11:15 AM CDT   Routine Prenatal Visit with PATRICK Jimenez - OB/ GYN  ([facility]) [address] [facility] [address] [phone]              Smoking Cessation     If you would like to quit smoking:   You may be eligible for free services if you are a [place] resident and started smoking cigarettes before September 1, 1988.  Call the Smoking Cessation Trust ([facility]) toll free at (859) 981-6477 or (841) 928-1913.   Call 1-800-QUIT-NOW if you do not meet the above criteria.   Contact us via email: [email or web address]   View our website for more information: [email or web address]         [facility] complies with applicable Federal civil rights laws and does not discriminate on the basis of race, color, national origin, age, disability, or sex.        Language Assistance Services     ATTENTION: Language assistance services are available, free of charge. Please call [phone].      ATENCIÓN: Si habla español, tiene a colvin disposición servicios gratuitos de asistencia lingüística. Llame al [phone].     CHÚ Ý: N?u b?n nói Ti?ng Vi?t, có các d?ch v? h? tr? ngôn ng? mi?n phí dành cho b?n. G?i s? [phone].

## 2017-04-27 NOTE — DISCHARGE INSTRUCTIONS
Abscess (Incision & Drainage)  An abscess (sometimes called a boil) occurs when bacteria get trapped under the skin and start to grow. Pus forms inside the abscess as the body responds to the bacteria. An abscess can happen with an insect bite, ingrown hair, blocked oil gland, pimple, cyst, or puncture wound.  Your healthcare provider has drained the pus from your abscess. If the abscess pocket was large, your healthcare provider may have inserted gauze packing. Your provider will need to remove and possibly replace it on your next visit. You may not need antibiotics to treat a simple abscess, unless the infection is spreading into the skin around the wound (cellulitis).  Healing of the wound will take about 1 to 2 weeks, depending on the size of the abscess. Healthy tissue will grow from the bottom and sides of the opening until it seals over.  Home care  These tips can help your wound heal:  · The wound may drain for the first 2 days. Cover the wound with a clean dry dressing. If the dressing becomes soaked with blood or pus, change it.  · If a gauze packing was placed inside the abscess cavity, you may be told to remove it yourself. You may do this in the shower. Once the packing is removed, you should wash the area in the shower or bath 3 to 4 times a day, until the skin opening has closed. Make sure you wash your hands after changing the packing or cleaning the wound.  · If you were prescribed antibiotics, take them as directed until they are all gone.  · You may use acetaminophen or ibuprofen to control pain, unless another pain medicine was prescribed. If you have liver disease or ever had a stomach ulcer, talk with your doctor before using these medicines.  Follow-up care  Follow up with your healthcare provider, or as advised. If a gauze packing was inserted in your wound, it should be removed in 1 to 2 days. Check your wound every day for the signs of worsening infection listed below.  When to seek  medical advice  Call your healthcare provider right away if any of these occur:  · Increasing redness or swelling  · Red streaks in the skin leading away from the wound  · Increasing local pain or swelling  · Continued pus draining from the wound 2 days after treatment  · Fever of 100.4ºF (38ºC) or higher, or as directed by your healthcare provider  · Boil returns when you are at home  Date Last Reviewed: 9/1/2016  © 1730-0055 [employer]. [address] [place] [address]. All rights reserved. This information is not intended as a substitute for professional medical care. Always follow your healthcare professional's instructions.

## 2017-04-27 NOTE — ED PROVIDER NOTES
History      Chief Complaint   Patient presents with    Abscess     to R inner thigh for about a week       Review of patient's allergies indicates:  No Known Allergies     HPI   HPI    4/27/2017, 12:11 AM   History obtained from the patient      History of Present Illness: Alize Morrow is a 27 y.o. female patient who presents to the Emergency Department for painful abscess to right inner thigh for 1 week.  22 weeks pregnant.  She [place] [facility] [place], no I&D.  Denies f/n/v, vag bleeding or abd pain.  Symptoms are constant and moderate in severity.  No mitigating or exacerbating factors reported.   No further complaints or concerns at this time.           PCP: Primary Doctor No       Past Medical History:  Past Medical History:   Diagnosis Date    Abnormal Pap smear of cervix     Migraine headache          Past Surgical History:  Past Surgical History:   Procedure Laterality Date    CERVICAL BIOPSY  W/ LOOP ELECTRODE EXCISION             Family History:  Family History   Problem Relation Age of Onset    Cancer Mother      colon     Diabetes Mother            Social History:  Social History     Social History Main Topics    Smoking status: Current Every Day Smoker     Packs/day: 0.50     Types: Cigarettes    Smokeless tobacco: Never Used    Alcohol use No    Drug use: Yes      Comment: pain pills- took one 4weeks ago    Sexual activity: Yes     Partners: Male       ROS   Review of Systems   Constitutional: Negative for activity change and appetite change.   HENT: Negative for sore throat.    Respiratory: Negative for shortness of breath.    Cardiovascular: Negative for chest pain.   Gastrointestinal: Negative for nausea and vomiting.   Genitourinary: Negative for dysuria.   Musculoskeletal: Negative for back pain and neck pain.   Skin: Positive for abscess.  Negative for rash.   Neurological: Negative for weakness.   Hematological: Does not bruise/bleed easily.   All other systems  reviewed and are negative.      Review of Systems    Physical Exam      Initial Vitals   BP Pulse Resp Temp SpO2   04/26/17 2321 04/26/17 2321 04/26/17 2321 04/26/17 2321 04/26/17 2321   118/61 83 18 97.8 °F (36.6 °C) 96 %     Physical Exam  Vital signs and nursing notes reviewed.  Constitutional: Patient is in NAD. Awake and alert. Well-developed and well-nourished.  Head: Atraumatic. Normocephalic.  Eyes: PERRL. EOM intact. Conjunctivae nl. No scleral icterus.  ENT: Mucous membranes are moist. Oropharynx is clear.  Neck: Supple. No JVD. No lymphadenopathy.  No meningismus  Cardiovascular: Regular rate and rhythm. No murmurs, rubs, or gallops. Distal pulses are 2+ and symmetric.  Pulmonary/Chest: No respiratory distress. Clear to auscultation bilaterally. No wheezing, rales, or rhonchi.  Abdominal: Soft. Non-distended. No TTP. No rebound, guarding, or rigidity. Good bowel sounds.  Genitourinary: No CVA tenderness  Musculoskeletal: Moves all extremities. No edema.   Skin: Warm and dry.   4cm of erythema with 2cm of central fluctuance to right medial thigh.    Neurological: Awake and alert. No acute focal neurological deficits are appreciated.  Psychiatric: Normal affect. Good eye contact. Appropriate in content.      ED Course      I & D - Incision and Drainage  Date/Time: 4/27/2017 12:13 AM  Performed by: GISELLA SANCHEZ  Authorized by: JOCE LISA   Consent Done: Yes  Consent: Verbal consent obtained.  Risks and benefits: risks, benefits and alternatives were discussed  Consent given by: patient  Patient understanding: patient states understanding of the procedure being performed  Patient consent: the patient's understanding of the procedure matches consent given  Procedure consent: procedure consent matches procedure scheduled  Type: abscess  Body area: lower extremity  Location details: right leg  Anesthesia: local infiltration    Anesthesia:  Anesthesia: local infiltration  Local Anesthetic: lidocaine 1%  "without epinephrine   Patient sedated: no  Scalpel size: 11  Incision type: single straight  Complexity: simple  Drainage: pus  Drainage amount: moderate  Wound treatment: incision,  wound left open and  wound packed  Packing material: 1/4 in gauze  Complications: No  Specimens: No  Implants: No  Patient tolerance: Patient tolerated the procedure well with no immediate complications        ED Vital Signs:  Vitals:    04/26/17 2321   BP: 118/61   Pulse: 83   Resp: 18   Temp: 97.8 °F (36.6 °C)   TempSrc: Oral   SpO2: 96%   Weight: 86.2 kg (190 lb)   Height: 5' 2" (1.575 m)                 Imaging Results:  Imaging Results     None             The Emergency Provider reviewed the vital signs and test results, which are outlined above.    ED Discussion               Medication(s) given in the ER:  Medications - No data to display        Follow-up Information     Follow up with [facility] - Internal [facility] In 3 days.    Specialty:  Internal Medicine    Contact information:    [address] [facility] [address] [phone]    Additional information:    [facility] [facility] 1st floor              New Prescriptions    No medications on file          Medical Decision Making        MDM               Clinical Impression:        ICD-10-CM ICD-9-CM   1. Abscess of right thigh L02.415 682.6   2. Acute thigh pain, right M79.651 729.5              Disposition  Stable  Discharged     Izzy Arechiga PA-C  04/27/17 0039    "

## 2017-05-12 ENCOUNTER — PROCEDURE VISIT (OUTPATIENT)
Dept: OBSTETRICS AND GYNECOLOGY | Facility: CLINIC | Age: 27
End: 2017-05-12
Payer: MEDICAID

## 2017-05-12 ENCOUNTER — ROUTINE PRENATAL (OUTPATIENT)
Dept: OBSTETRICS AND GYNECOLOGY | Facility: CLINIC | Age: 27
End: 2017-05-12
Payer: MEDICAID

## 2017-05-12 VITALS
SYSTOLIC BLOOD PRESSURE: 112 MMHG | WEIGHT: 194.25 LBS | DIASTOLIC BLOOD PRESSURE: 78 MMHG | BODY MASS INDEX: 35.52 KG/M2

## 2017-05-12 DIAGNOSIS — Z64.1 MULTIGRAVIDA: Primary | ICD-10-CM

## 2017-05-12 DIAGNOSIS — Z3A.24 24 WEEKS GESTATION OF PREGNANCY: ICD-10-CM

## 2017-05-12 PROCEDURE — 99212 OFFICE O/P EST SF 10 MIN: CPT | Mod: PBBFAC | Performed by: ADVANCED PRACTICE MIDWIFE

## 2017-05-12 PROCEDURE — 99212 OFFICE O/P EST SF 10 MIN: CPT | Mod: TH,S$PBB,, | Performed by: ADVANCED PRACTICE MIDWIFE

## 2017-05-12 PROCEDURE — 99999 PR PBB SHADOW E&M-EST. PATIENT-LVL II: CPT | Mod: PBBFAC,,, | Performed by: ADVANCED PRACTICE MIDWIFE

## 2017-05-12 PROCEDURE — 76816 OB US FOLLOW-UP PER FETUS: CPT | Mod: PBBFAC | Performed by: OBSTETRICS & GYNECOLOGY

## 2017-05-12 PROCEDURE — 76816 OB US FOLLOW-UP PER FETUS: CPT | Mod: 26,S$PBB,, | Performed by: OBSTETRICS & GYNECOLOGY

## 2017-05-12 NOTE — PROGRESS NOTES
Doing well, no complaints.  Anatomy with suboptimal heart - will re-scan next visit.   [facility] Tdap handout provided and reviewed. Will admin next visit if patient desires.  28 week labs next visit.   Reviewed L&D precautions.

## 2017-06-05 ENCOUNTER — HOSPITAL ENCOUNTER (OUTPATIENT)
Facility: HOSPITAL | Age: 27
Discharge: HOME | End: 2017-06-06
Attending: OBSTETRICS & GYNECOLOGY | Admitting: OBSTETRICS & GYNECOLOGY
Payer: MEDICAID

## 2017-06-05 VITALS
HEIGHT: 62 IN | DIASTOLIC BLOOD PRESSURE: 67 MMHG | HEART RATE: 100 BPM | BODY MASS INDEX: 35.7 KG/M2 | TEMPERATURE: 98 F | SYSTOLIC BLOOD PRESSURE: 134 MMHG | RESPIRATION RATE: 20 BRPM | WEIGHT: 194 LBS

## 2017-06-05 DIAGNOSIS — R10.819 LOWER ABDOMINAL TENDERNESS: ICD-10-CM

## 2017-06-05 DIAGNOSIS — R10.30 LOWER ABDOMINAL PAIN: ICD-10-CM

## 2017-06-05 LAB
BASOPHILS # BLD AUTO: 0.01 K/UL
BASOPHILS NFR BLD: 0.1 %
BILIRUB UR QL STRIP: NEGATIVE
CLARITY UR: CLEAR
COLOR UR: YELLOW
DIFFERENTIAL METHOD: ABNORMAL
EOSINOPHIL # BLD AUTO: 0 K/UL
EOSINOPHIL NFR BLD: 0.3 %
ERYTHROCYTE [DISTWIDTH] IN BLOOD BY AUTOMATED COUNT: 13.4 %
FIBRONECTIN FETAL SPEC QL: NEGATIVE
GLUCOSE UR QL STRIP: NEGATIVE
HCT VFR BLD AUTO: 31.7 %
HGB BLD-MCNC: 11 G/DL
HGB UR QL STRIP: NEGATIVE
KETONES UR QL STRIP: NEGATIVE
LEUKOCYTE ESTERASE UR QL STRIP: NEGATIVE
LYMPHOCYTES # BLD AUTO: 2.9 K/UL
LYMPHOCYTES NFR BLD: 20.4 %
MCH RBC QN AUTO: 32.2 PG
MCHC RBC AUTO-ENTMCNC: 34.7 %
MCV RBC AUTO: 93 FL
MONOCYTES # BLD AUTO: 0.6 K/UL
MONOCYTES NFR BLD: 4.2 %
NEUTROPHILS # BLD AUTO: 10.6 K/UL
NEUTROPHILS NFR BLD: 75 %
NITRITE UR QL STRIP: NEGATIVE
PH UR STRIP: 8 [PH] (ref 5–8)
PLATELET # BLD AUTO: 227 K/UL
PMV BLD AUTO: 10.7 FL
PROT UR QL STRIP: NEGATIVE
RBC # BLD AUTO: 3.42 M/UL
SP GR UR STRIP: 1.02 (ref 1–1.03)
URN SPEC COLLECT METH UR: NORMAL
UROBILINOGEN UR STRIP-ACNC: NEGATIVE EU/DL
WBC # BLD AUTO: 14.15 K/UL

## 2017-06-05 PROCEDURE — 82731 ASSAY OF FETAL FIBRONECTIN: CPT

## 2017-06-05 PROCEDURE — 96361 HYDRATE IV INFUSION ADD-ON: CPT

## 2017-06-05 PROCEDURE — 87591 N.GONORRHOEAE DNA AMP PROB: CPT

## 2017-06-05 PROCEDURE — 81003 URINALYSIS AUTO W/O SCOPE: CPT

## 2017-06-05 PROCEDURE — 99211 OFF/OP EST MAY X REQ PHY/QHP: CPT | Mod: 25

## 2017-06-05 PROCEDURE — 85025 COMPLETE CBC W/AUTO DIFF WBC: CPT

## 2017-06-05 PROCEDURE — 96360 HYDRATION IV INFUSION INIT: CPT

## 2017-06-05 PROCEDURE — 59025 FETAL NON-STRESS TEST: CPT

## 2017-06-05 RX ORDER — ACETAMINOPHEN 500 MG
500 TABLET ORAL EVERY 6 HOURS PRN
Status: DISCONTINUED | OUTPATIENT
Start: 2017-06-05 | End: 2017-06-06 | Stop reason: HOSPADM

## 2017-06-05 RX ORDER — SODIUM CHLORIDE, SODIUM LACTATE, POTASSIUM CHLORIDE, CALCIUM CHLORIDE 600; 310; 30; 20 MG/100ML; MG/100ML; MG/100ML; MG/100ML
INJECTION, SOLUTION INTRAVENOUS CONTINUOUS
Status: DISCONTINUED | OUTPATIENT
Start: 2017-06-05 | End: 2017-06-06 | Stop reason: HOSPADM

## 2017-06-05 RX ORDER — ONDANSETRON 8 MG/1
8 TABLET, ORALLY DISINTEGRATING ORAL EVERY 8 HOURS PRN
Status: DISCONTINUED | OUTPATIENT
Start: 2017-06-05 | End: 2017-06-06 | Stop reason: HOSPADM

## 2017-06-06 LAB
C TRACH DNA SPEC QL NAA+PROBE: NOT DETECTED
N GONORRHOEA DNA SPEC QL NAA+PROBE: NOT DETECTED

## 2017-06-06 PROCEDURE — 99213 OFFICE O/P EST LOW 20 MIN: CPT | Mod: TH,S$PBB,, | Performed by: ADVANCED PRACTICE MIDWIFE

## 2017-06-06 NOTE — DISCHARGE SUMMARY
[facility]  Obstetrics  Discharge Summary      Patient Name: Alize Morrow  MRN: [id number]  Admission Date: 2017  Hospital Length of Stay: 0 days  Discharge Date and Time: No discharge date for patient encounter.  Attending Physician: Gayatri Rodriguez MD   Discharging Provider: Barbara Moody CNM  Primary Care Provider: Primary Doctor No    HPI: 17 at 0130hrs   Lower abdominal discomfort        * No surgery found *     Hospital Course:   17 at 0130hrs  27 yr old  with IUP  27w5d  Cat 1 strip  Low abdominal discomfort          Final Active Diagnoses:    Diagnosis Date Noted POA    PRINCIPAL PROBLEM:  Lower abdominal tenderness [R10.819] 2017 Yes    Lower abdominal pain [R10.30] 2017 Unknown      Problems Resolved During this Admission:    Diagnosis Date Noted Date Resolved POA        Labs:   CBC   Recent Labs  Lab 17  1330   WBC 14.15*   HGB 11.0*   HCT 31.7*       and All labs within the past 24 hours have been reviewed  FFN Negative          Immunizations     None          This patient has no babies on file.  Pending Diagnostic Studies:     None          Discharged Condition: good    Disposition:     Follow Up:  Follow-up Information     Follow up In 1 week.               Patient Instructions:   No discharge procedures on file.  Medications:  Current Discharge Medication List      CONTINUE these medications which have NOT CHANGED    Details   butalbital-aspirin-caffeine -40 mg (FIORINAL) -40 mg Cap Take 1 capsule by mouth every 4 (four) hours as needed.      PNV14/IRON/FA#1/DHA/KEVIN CA (PNV #14-IRON-FA#1-DHA-DOCUSATE ORAL) Take by mouth.      sumatriptan (IMITREX) 50 MG tablet Take 1 tablet (50 mg total) by mouth once as needed for Migraine. May repeat dose x1 after 2 hour  Qty: 15 tablet, Refills: 0             Barbara Moody CNM  Obstetrics  [facility]

## 2017-06-06 NOTE — H&P
[facility]   Obstetrics  History & Physical    Patient Name: Alize Morrow  MRN: [id number]  Admission Date: 2017  Primary Care Provider: Primary Doctor No    Subjective:     Principal Problem:Lower abdominal pain    History of Present Illness:  17 at 0130hrs   Lower abdominal discomfort    No new subjective & objective note has been filed under this hospital service since the last note was generated.    Assessment/Plan:     27 y.o. female  at 27w6d for:    * Lower abdominal pain    27 yr old  with IUP 27w5d  Cat 1 strip  Gc/CMZ, GBS FFN   CBC and IV hydration            Barbara Moody, SUMMERM  Obstetrics  [facility]

## 2017-06-09 ENCOUNTER — LAB VISIT (OUTPATIENT)
Dept: LAB | Facility: HOSPITAL | Age: 27
End: 2017-06-09
Attending: ADVANCED PRACTICE MIDWIFE
Payer: MEDICAID

## 2017-06-09 ENCOUNTER — ROUTINE PRENATAL (OUTPATIENT)
Dept: OBSTETRICS AND GYNECOLOGY | Facility: CLINIC | Age: 27
End: 2017-06-09
Payer: MEDICAID

## 2017-06-09 ENCOUNTER — PROCEDURE VISIT (OUTPATIENT)
Dept: OBSTETRICS AND GYNECOLOGY | Facility: CLINIC | Age: 27
End: 2017-06-09
Payer: MEDICAID

## 2017-06-09 VITALS
BODY MASS INDEX: 35.93 KG/M2 | DIASTOLIC BLOOD PRESSURE: 64 MMHG | WEIGHT: 196.44 LBS | SYSTOLIC BLOOD PRESSURE: 110 MMHG

## 2017-06-09 DIAGNOSIS — Z34.93 ENCOUNTER FOR SUPERVISION OF NORMAL PREGNANCY IN THIRD TRIMESTER, UNSPECIFIED GRAVIDITY: Primary | ICD-10-CM

## 2017-06-09 DIAGNOSIS — Z3A.28 28 WEEKS GESTATION OF PREGNANCY: ICD-10-CM

## 2017-06-09 DIAGNOSIS — Z3A.24 24 WEEKS GESTATION OF PREGNANCY: ICD-10-CM

## 2017-06-09 LAB
BASOPHILS # BLD AUTO: 0.02 K/UL
BASOPHILS NFR BLD: 0.2 %
DIFFERENTIAL METHOD: ABNORMAL
EOSINOPHIL # BLD AUTO: 0.1 K/UL
EOSINOPHIL NFR BLD: 0.4 %
ERYTHROCYTE [DISTWIDTH] IN BLOOD BY AUTOMATED COUNT: 13.4 %
GLUCOSE SERPL-MCNC: 135 MG/DL
HCT VFR BLD AUTO: 31.6 %
HGB BLD-MCNC: 10.7 G/DL
LYMPHOCYTES # BLD AUTO: 2.6 K/UL
LYMPHOCYTES NFR BLD: 19.5 %
MCH RBC QN AUTO: 32.1 PG
MCHC RBC AUTO-ENTMCNC: 33.9 %
MCV RBC AUTO: 95 FL
MONOCYTES # BLD AUTO: 0.6 K/UL
MONOCYTES NFR BLD: 4.6 %
NEUTROPHILS # BLD AUTO: 9.8 K/UL
NEUTROPHILS NFR BLD: 75 %
PLATELET # BLD AUTO: 230 K/UL
PMV BLD AUTO: 10.8 FL
RBC # BLD AUTO: 3.33 M/UL
WBC # BLD AUTO: 13.07 K/UL

## 2017-06-09 PROCEDURE — 76816 OB US FOLLOW-UP PER FETUS: CPT | Mod: 26,S$PBB,, | Performed by: OBSTETRICS & GYNECOLOGY

## 2017-06-09 PROCEDURE — 99999 PR PBB SHADOW E&M-EST. PATIENT-LVL II: CPT | Mod: PBBFAC,,, | Performed by: ADVANCED PRACTICE MIDWIFE

## 2017-06-09 PROCEDURE — 76816 OB US FOLLOW-UP PER FETUS: CPT | Mod: PBBFAC | Performed by: OBSTETRICS & GYNECOLOGY

## 2017-06-09 PROCEDURE — 99213 OFFICE O/P EST LOW 20 MIN: CPT | Mod: TH,S$PBB,, | Performed by: ADVANCED PRACTICE MIDWIFE

## 2017-06-09 NOTE — PROGRESS NOTES
Doing well. Anatomy scan complete. PTL talk. 28 week labs today. TDAP today.     Coffective counseling sheet Keep Baby Close discussed with mother. Reinforced rooming in practices, continued skin to skin, and quiet hours as requested by mother.  Encouraged mother to download Coffective mobile sierra if she has not already done so. Mother verbalizes understanding.

## 2017-06-10 LAB — RPR SER QL: NORMAL

## 2017-06-12 LAB — HIV 1+2 AB+HIV1 P24 AG SERPL QL IA: NEGATIVE

## 2017-06-23 ENCOUNTER — ROUTINE PRENATAL (OUTPATIENT)
Dept: OBSTETRICS AND GYNECOLOGY | Facility: CLINIC | Age: 27
End: 2017-06-23
Payer: MEDICAID

## 2017-06-23 VITALS — SYSTOLIC BLOOD PRESSURE: 116 MMHG | WEIGHT: 196 LBS | BODY MASS INDEX: 35.85 KG/M2 | DIASTOLIC BLOOD PRESSURE: 64 MMHG

## 2017-06-23 DIAGNOSIS — O99.213 OBESITY COMPLICATING PREGNANCY, THIRD TRIMESTER: Primary | ICD-10-CM

## 2017-06-23 DIAGNOSIS — D50.9 IRON DEFICIENCY ANEMIA, UNSPECIFIED IRON DEFICIENCY ANEMIA TYPE: ICD-10-CM

## 2017-06-23 PROBLEM — R10.30 LOWER ABDOMINAL PAIN: Status: RESOLVED | Noted: 2017-06-05 | Resolved: 2017-06-23

## 2017-06-23 PROBLEM — Z64.1 MULTIGRAVIDA: Status: RESOLVED | Noted: 2017-05-12 | Resolved: 2017-06-23

## 2017-06-23 PROBLEM — R10.819 LOWER ABDOMINAL TENDERNESS: Status: RESOLVED | Noted: 2017-06-05 | Resolved: 2017-06-23

## 2017-06-23 PROCEDURE — 99213 OFFICE O/P EST LOW 20 MIN: CPT | Mod: TH,S$PBB,, | Performed by: ADVANCED PRACTICE MIDWIFE

## 2017-06-23 PROCEDURE — 99999 PR PBB SHADOW E&M-EST. PATIENT-LVL II: CPT | Mod: PBBFAC,,, | Performed by: ADVANCED PRACTICE MIDWIFE

## 2017-06-23 PROCEDURE — 99212 OFFICE O/P EST SF 10 MIN: CPT | Mod: PBBFAC | Performed by: ADVANCED PRACTICE MIDWIFE

## 2017-06-23 NOTE — PROGRESS NOTES
Doing well. Having some hip pain, recommendations made.  Reviewed 28 week labs, pt with anemia. Rx iron sent to pharmacy. PTL talk    Coffective counseling sheet Keep Baby Close discussed with mother. Reinforced rooming in practices, continued skin to skin, and quiet hours as requested by mother.  Encouraged mother to download Coffective mobile sierra if she has not already done so. Mother verbalizes understanding.

## 2018-11-21 ENCOUNTER — HOSPITAL ENCOUNTER (EMERGENCY)
Facility: HOSPITAL | Age: 28
Discharge: HOME | End: 2018-11-21
Attending: EMERGENCY MEDICINE
Payer: MEDICAID

## 2018-11-21 VITALS
SYSTOLIC BLOOD PRESSURE: 149 MMHG | WEIGHT: 191.13 LBS | OXYGEN SATURATION: 97 % | HEART RATE: 93 BPM | BODY MASS INDEX: 35.17 KG/M2 | TEMPERATURE: 98 F | HEIGHT: 62 IN | RESPIRATION RATE: 20 BRPM | DIASTOLIC BLOOD PRESSURE: 75 MMHG

## 2018-11-21 DIAGNOSIS — S13.4XXA WHIPLASH INJURY TO NECK, INITIAL ENCOUNTER: ICD-10-CM

## 2018-11-21 DIAGNOSIS — Z71.6 TOBACCO ABUSE COUNSELING: ICD-10-CM
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
## 2018-11-21 DIAGNOSIS — R03.0 ELEVATED BLOOD PRESSURE READING: ICD-10-CM

## 2018-11-21 DIAGNOSIS — M54.2 ACUTE NECK PAIN: ICD-10-CM

## 2018-11-21 PROCEDURE — 96372 THER/PROPH/DIAG INJ SC/IM: CPT

## 2018-11-21 PROCEDURE — 63600175 PHARM REV CODE 636 W HCPCS: Performed by: NURSE PRACTITIONER

## 2018-11-21 PROCEDURE — 99284 EMERGENCY DEPT VISIT MOD MDM: CPT | Mod: 25

## 2018-11-21 PROCEDURE — 25000003 PHARM REV CODE 250: Performed by: NURSE PRACTITIONER

## 2018-11-21 RX ORDER — ORPHENADRINE CITRATE 30 MG/ML
60 INJECTION INTRAMUSCULAR; INTRAVENOUS
Status: COMPLETED | OUTPATIENT
Start: 2018-11-21 | End: 2018-11-21

## 2018-11-21 RX ORDER — DICLOFENAC SODIUM 50 MG/1
50 TABLET, DELAYED RELEASE ORAL 3 TIMES DAILY PRN
Qty: 15 TABLET | Refills: 0 | Status: SHIPPED | OUTPATIENT
Start: 2018-11-21

## 2018-11-21 RX ORDER — ORPHENADRINE CITRATE 100 MG/1
100 TABLET, EXTENDED RELEASE ORAL 2 TIMES DAILY PRN
Qty: 20 TABLET | Refills: 0 | Status: SHIPPED | OUTPATIENT
Start: 2018-11-21 | End: 2018-12-01

## 2018-11-21 RX ORDER — KETOROLAC TROMETHAMINE 10 MG/1
10 TABLET, FILM COATED ORAL
Status: COMPLETED | OUTPATIENT
Start: 2018-11-21 | End: 2018-11-21

## 2018-11-21 RX ADMIN — KETOROLAC TROMETHAMINE 10 MG: 10 TABLET, FILM COATED ORAL at 09:11

## 2018-11-21 RX ADMIN — ORPHENADRINE CITRATE 60 MG: 30 INJECTION INTRAMUSCULAR; INTRAVENOUS at 09:11
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
